# Patient Record
Sex: FEMALE | Race: WHITE | Employment: UNEMPLOYED | URBAN - METROPOLITAN AREA
[De-identification: names, ages, dates, MRNs, and addresses within clinical notes are randomized per-mention and may not be internally consistent; named-entity substitution may affect disease eponyms.]

---

## 2021-11-15 ENCOUNTER — OFFICE VISIT (OUTPATIENT)
Dept: FAMILY MEDICINE CLINIC | Age: 56
End: 2021-11-15
Payer: MEDICAID

## 2021-11-15 VITALS
RESPIRATION RATE: 10 BRPM | WEIGHT: 171 LBS | DIASTOLIC BLOOD PRESSURE: 78 MMHG | HEART RATE: 87 BPM | OXYGEN SATURATION: 99 % | SYSTOLIC BLOOD PRESSURE: 128 MMHG

## 2021-11-15 DIAGNOSIS — Z12.31 ENCOUNTER FOR SCREENING MAMMOGRAM FOR MALIGNANT NEOPLASM OF BREAST: ICD-10-CM

## 2021-11-15 DIAGNOSIS — Z13.220 LIPID SCREENING: ICD-10-CM

## 2021-11-15 DIAGNOSIS — G89.29 CHRONIC RIGHT SHOULDER PAIN: ICD-10-CM

## 2021-11-15 DIAGNOSIS — B00.9 HERPES: ICD-10-CM

## 2021-11-15 DIAGNOSIS — D50.0 IRON DEFICIENCY ANEMIA DUE TO CHRONIC BLOOD LOSS: ICD-10-CM

## 2021-11-15 DIAGNOSIS — Z12.31 ENCOUNTER FOR SCREENING MAMMOGRAM FOR MALIGNANT NEOPLASM OF BREAST: Primary | ICD-10-CM

## 2021-11-15 DIAGNOSIS — Z23 ENCOUNTER FOR IMMUNIZATION: ICD-10-CM

## 2021-11-15 DIAGNOSIS — Z12.11 COLON CANCER SCREENING: ICD-10-CM

## 2021-11-15 DIAGNOSIS — M25.511 CHRONIC RIGHT SHOULDER PAIN: ICD-10-CM

## 2021-11-15 PROCEDURE — 90686 IIV4 VACC NO PRSV 0.5 ML IM: CPT | Performed by: FAMILY MEDICINE

## 2021-11-15 PROCEDURE — 99204 OFFICE O/P NEW MOD 45 MIN: CPT | Performed by: FAMILY MEDICINE

## 2021-11-15 RX ORDER — ACYCLOVIR 400 MG/1
400 TABLET ORAL
Qty: 50 TABLET | Refills: 0 | Status: SHIPPED | OUTPATIENT
Start: 2021-11-15 | End: 2021-11-25

## 2021-11-15 RX ORDER — MELOXICAM 15 MG/1
15 TABLET ORAL DAILY
Qty: 30 TABLET | Refills: 0 | Status: SHIPPED | OUTPATIENT
Start: 2021-11-15

## 2021-11-15 NOTE — PATIENT INSTRUCTIONS
Iron Deficiency Anemia: Care Instructions  Your Care Instructions     Anemia means that you don't have enough red blood cells. Red blood cells carry oxygen around your body. When you have anemia, it can make you pale, weak, and tired. Many things can cause anemia. The most common cause is loss of blood. This can happen if you have heavy menstrual periods. It can also happen if you have bleeding in your stomach or bowel. You can also get anemia if you don't have enough iron in your diet or if it's hard for your body to absorb iron. In some cases, pregnancy causes anemia. That's because a pregnant woman needs more iron. Your doctor may do more tests to find the cause of your anemia. If a disease or other health problem is causing it, your doctor will treat that problem. It's important to follow up with your doctor to make sure that your iron level returns to normal.  Follow-up care is a key part of your treatment and safety. Be sure to make and go to all appointments, and call your doctor if you are having problems. It's also a good idea to know your test results and keep a list of the medicines you take. How can you care for yourself at home? · If your doctor recommended iron pills, take them as directed. ? Try to take the pills on an empty stomach. You can do this about 1 hour before or 2 hours after meals. But you may need to take iron with food to avoid an upset stomach. ? Do not take antacids or drink milk or anything with caffeine within 2 hours of when you take your iron. They can keep your body from absorbing the iron well. ? Vitamin C helps your body absorb iron. You may want to take iron pills with a glass of orange juice or some other food high in vitamin C.  ? Iron pills may cause stomach problems. These include heartburn, nausea, diarrhea, constipation, and cramps. It can help to drink plenty of fluids and include fruits, vegetables, and fiber in your diet.   ? It's normal for iron pills to make your stool a greenish or grayish black. But internal bleeding can also cause dark stool. So it's important to tell your doctor about any color changes. ? Call your doctor if you think you are having a problem with your iron pills. Even after you start to feel better, it will take several months for your body to build up its supply of iron. ? If you miss a pill, don't take a double dose. ? Keep iron pills out of the reach of small children. Too much iron can be very dangerous. · Eat foods with a lot of iron. These include red meat, shellfish, poultry, and eggs. They also include beans, raisins, whole-grain bread, and leafy green vegetables. · Steam your vegetables. This is the best way to prepare them if you want to get as much iron as possible. · Be safe with medicines. Do not take nonsteroidal anti-inflammatory pain relievers unless your doctor tells you to. These include aspirin, naproxen (Aleve), and ibuprofen (Advil, Motrin). · Liquid iron can stain your teeth. But you can mix it with water or juice and drink it with a straw. Then it won't get on your teeth. When should you call for help? Call 911 anytime you think you may need emergency care. For example, call if:    · You passed out (lost consciousness). Call your doctor now or seek immediate medical care if:    · You are short of breath.     · You are dizzy or light-headed, or you feel like you may faint.     · You have new or worse bleeding. Watch closely for changes in your health, and be sure to contact your doctor if:    · You feel weaker or more tired than usual.     · You do not get better as expected. Where can you learn more? Go to http://www.gray.com/  Enter Z825 in the search box to learn more about \"Iron Deficiency Anemia: Care Instructions. \"  Current as of: April 29, 2021               Content Version: 13.0  © 5364-1322 [employer].    Care instructions adapted under license by [employer] (which disclaims liability or warranty for this information). If you have questions about a medical condition or this instruction, always ask your healthcare professional. [address] any warranty or liability for your use of this information.

## 2021-11-15 NOTE — PROGRESS NOTES
Eryn rAnett is a 49 y.o. female  presents for establish care. She has multiple mental health drugs. She has appt with mental health. Allergies   Allergen Reactions    Sulfa (Sulfonamide Antibiotics) Hives       There is no problem list on file for this patient. Past Medical History:   Diagnosis Date    Cold sore     History of alcohol use      Social History     Socioeconomic History    Marital status: UNKNOWN   Tobacco Use    Smoking status: Current Every Day Smoker     Packs/day: 1.00    Smokeless tobacco: Never Used     No family history on file. Review of Systems   Constitutional: Negative for chills, fever, malaise/fatigue and weight loss. Eyes: Negative for blurred vision. Respiratory: Negative for cough, shortness of breath and wheezing. Cardiovascular: Negative for chest pain. Gastrointestinal: Negative for nausea and vomiting. Musculoskeletal: Negative for myalgias. Skin: Negative for rash. Neurological: Negative for weakness. Psychiatric/Behavioral: Positive for substance abuse. Negative for depression, hallucinations, memory loss and suicidal ideas. The patient is not nervous/anxious and does not have insomnia. Vitals:    11/15/21 1335   BP: 128/78   Pulse: 87   Resp: 10   SpO2: 99%   Weight: 171 lb (77.6 kg)       Physical Exam  Vitals and nursing note reviewed. Neck:      Thyroid: No thyromegaly. Cardiovascular:      Rate and Rhythm: Normal rate and regular rhythm. Heart sounds: Normal heart sounds. Pulmonary:      Effort: Pulmonary effort is normal.      Breath sounds: Normal breath sounds. Musculoskeletal:         General: Normal range of motion. Cervical back: Normal range of motion and neck supple. Skin:     General: Skin is warm and dry. Capillary Refill: Capillary refill takes less than 2 seconds. Neurological:      General: No focal deficit present.       Mental Status: She is alert and oriented to person, place, and time.   Psychiatric:         Mood and Affect: Mood normal.         Behavior: Behavior normal.         Thought Content: Thought content normal.         Judgment: Judgment normal.         Assessment/Plan      ICD-10-CM ICD-9-CM    1. Encounter for screening mammogram for malignant neoplasm of breast  Z12.31 V76.12 GERMÁN MAMMO BI SCREENING INCL CAD   2. Colon cancer screening  Z12.11 V76.51 REFERRAL TO GASTROENTEROLOGY   3. Chronic right shoulder pain  M25.511 719.41 XR SHOULDER RT AP/LAT MIN 2 V    G89.29 338.29 meloxicam (MOBIC) 15 mg tablet   4. Encounter for immunization  Z23 V03.89 INFLUENZA VIRUS VAC QUAD,SPLIT,PRESV FREE SYRINGE IM   5. Iron deficiency anemia due to chronic blood loss  D50.0 280.0 CBC WITH AUTOMATED DIFF      METABOLIC PANEL, COMPREHENSIVE   6. Lipid screening  Z13.220 V77.91 LIPID PANEL   7. Herpes  B00.9 054.9 acyclovir (ZOVIRAX) 400 mg tablet     I have discussed the diagnosis with the patient and the intended plan of care as seen in the above orders. The patient has received an after-visit summary and questions were answered concerning future plans. I have discussed medication, side effects, and warnings with the patient in detail. The patient verbalized understanding and is in agreement with the plan of care. The patient will contact the office with any additional concerns.         lab results and schedule of future lab studies reviewed with patient    Abiodun Huynh MD

## 2021-11-16 DIAGNOSIS — G89.29 CHRONIC RIGHT SHOULDER PAIN: ICD-10-CM

## 2021-11-16 DIAGNOSIS — M25.511 CHRONIC RIGHT SHOULDER PAIN: ICD-10-CM

## 2021-11-30 ENCOUNTER — TELEPHONE (OUTPATIENT)
Dept: FAMILY MEDICINE CLINIC | Age: 56
End: 2021-11-30

## 2021-12-01 LAB
A-G RATIO,AGRAT: 1.4 RATIO (ref 1.1–2.6)
A-G RATIO,AGRAT: 1.4 RATIO (ref 1.1–2.6)
ABSOLUTE LYMPHOCYTE COUNT, 10803: 3.1 K/UL (ref 1–4.8)
ALBUMIN SERPL-MCNC: 4.2 G/DL (ref 3.5–5)
ALBUMIN SERPL-MCNC: 4.3 G/DL (ref 3.5–5)
ALP SERPL-CCNC: 120 U/L (ref 25–115)
ALP SERPL-CCNC: 122 U/L (ref 25–115)
ALT SERPL-CCNC: 15 U/L (ref 5–40)
ALT SERPL-CCNC: 15 U/L (ref 5–40)
ANION GAP SERPL CALC-SCNC: 11 MMOL/L (ref 3–15)
AST SERPL W P-5'-P-CCNC: 22 U/L (ref 10–37)
AST SERPL W P-5'-P-CCNC: 23 U/L (ref 10–37)
BASOPHILS # BLD: 0.1 K/UL (ref 0–0.2)
BASOPHILS NFR BLD: 1 % (ref 0–2)
BILIRUB SERPL-MCNC: 0.2 MG/DL (ref 0.2–1.2)
BILIRUB SERPL-MCNC: 0.3 MG/DL (ref 0.2–1.2)
BILIRUBIN, DIRECT,CBIL: <0.2 MG/DL (ref 0–0.3)
BUN SERPL-MCNC: 7 MG/DL (ref 6–22)
CALCIUM SERPL-MCNC: 9.9 MG/DL (ref 8.4–10.5)
CHLORIDE SERPL-SCNC: 102 MMOL/L (ref 98–110)
CHOLEST SERPL-MCNC: 218 MG/DL (ref 110–200)
CO2 SERPL-SCNC: 26 MMOL/L (ref 20–32)
CREAT SERPL-MCNC: 0.6 MG/DL (ref 0.5–1.2)
EOSINOPHIL # BLD: 0.5 K/UL (ref 0–0.5)
EOSINOPHIL NFR BLD: 5 % (ref 0–6)
ERYTHROCYTE [DISTWIDTH] IN BLOOD BY AUTOMATED COUNT: 17.5 % (ref 10–15.5)
GFRAA, 66117: >60
GFRNA, 66118: >60
GLOBULIN,GLOB: 3 G/DL (ref 2–4)
GLOBULIN,GLOB: 3 G/DL (ref 2–4)
GLUCOSE SERPL-MCNC: 73 MG/DL (ref 70–99)
GRANULOCYTES,GRANS: 55 % (ref 40–75)
HCT VFR BLD AUTO: 34.5 % (ref 35.1–48)
HDLC SERPL-MCNC: 3 MG/DL (ref 0–5)
HDLC SERPL-MCNC: 73 MG/DL
HGB BLD-MCNC: 10.1 G/DL (ref 11.7–16)
LDL/HDL RATIO,LDHD: 1.7
LDLC SERPL CALC-MCNC: 123 MG/DL (ref 50–99)
LYMPHOCYTES, LYMLT: 34 % (ref 20–45)
MCH RBC QN AUTO: 23 PG (ref 26–34)
MCHC RBC AUTO-ENTMCNC: 29 G/DL (ref 31–36)
MCV RBC AUTO: 78 FL (ref 80–99)
MONOCYTES # BLD: 0.5 K/UL (ref 0.1–1)
MONOCYTES NFR BLD: 6 % (ref 3–12)
NEUTROPHILS # BLD AUTO: 5 K/UL (ref 1.8–7.7)
NON-HDL CHOLESTEROL, 011976: 145 MG/DL
PLATELET # BLD AUTO: 292 K/UL (ref 140–440)
PMV BLD AUTO: 11 FL (ref 9–13)
POTASSIUM SERPL-SCNC: 4.6 MMOL/L (ref 3.5–5.5)
PROT SERPL-MCNC: 7.2 G/DL (ref 6.4–8.3)
PROT SERPL-MCNC: 7.3 G/DL (ref 6.4–8.3)
RBC # BLD AUTO: 4.43 M/UL (ref 3.8–5.2)
SODIUM SERPL-SCNC: 139 MMOL/L (ref 133–145)
TRIGL SERPL-MCNC: 108 MG/DL (ref 40–149)
VLDLC SERPL CALC-MCNC: 22 MG/DL (ref 8–30)
WBC # BLD AUTO: 9.1 K/UL (ref 4–11)

## 2021-12-02 NOTE — TELEPHONE ENCOUNTER
I was able to pull over xray from 11/19. Can you please review Dr. Mervin Mackay so I can call patient? Impression  Performed by RADIOLOGY    No acute abnormality. Signed By: Berna Nath MD on 11/19/2021 3:43 PM    Narrative  Performed by RADIOLOGY  EXAM: RIGHT SHOULDER RADIOGRAPHS     CLINICAL INDICATION/HISTORY: Provided with order -   M25.511: Pain in right shoulder   G89.29: Other chronic pain     > Additional: None     COMPARISON: None     TECHNIQUE: 4 views of the right shoulder     _______________     FINDINGS:     There is normal alignment of the glenohumeral joint. There is no evidence of acute fracture or dislocation.  Mild degenerative osteoarthrosis of the acromioclavicular joint.  Visualized periarticular soft tissues and right lung apex are unremarkable.     _______________    Procedure Note    Elisabeth Gongora MD - 11/19/2021   Formatting of this note might be different from the original.   EXAM: RIGHT SHOULDER RADIOGRAPHS     CLINICAL INDICATION/HISTORY: Provided with order -   M25.511: Pain in right shoulder   G89.29: Other chronic pain     > Additional: None     COMPARISON: None     TECHNIQUE: 4 views of the right shoulder     _______________     FINDINGS:     There is normal alignment of the glenohumeral joint. There is no evidence of acute fracture or dislocation.  Mild degenerative osteoarthrosis of the acromioclavicular joint.  Visualized periarticular soft tissues and right lung apex are unremarkable.     _______________     IMPRESSION     No acute abnormality.

## 2021-12-06 NOTE — PROGRESS NOTES
She is mildly anemic and elevated lipids. Need to be on low cholesterol diet and ferrous sulfate 325 bid otc. Repeat labs in 2 months.

## 2021-12-20 NOTE — PROGRESS NOTES
12/13 disregard below message. I tried contacting pt on that date. Left message for patient at home number requesting return call.

## 2022-05-23 ENCOUNTER — PATIENT MESSAGE (OUTPATIENT)
Dept: FAMILY MEDICINE CLINIC | Age: 57
End: 2022-05-23

## 2022-06-01 ENCOUNTER — TELEPHONE (OUTPATIENT)
Dept: MAMMOGRAPHY | Age: 57
End: 2022-06-01
